# Patient Record
Sex: MALE | Race: BLACK OR AFRICAN AMERICAN | NOT HISPANIC OR LATINO | Employment: OTHER | URBAN - METROPOLITAN AREA
[De-identification: names, ages, dates, MRNs, and addresses within clinical notes are randomized per-mention and may not be internally consistent; named-entity substitution may affect disease eponyms.]

---

## 2017-05-16 ENCOUNTER — OFFICE VISIT (OUTPATIENT)
Dept: UROLOGY | Facility: CLINIC | Age: 82
End: 2017-05-16
Payer: MEDICARE

## 2017-05-16 VITALS
SYSTOLIC BLOOD PRESSURE: 138 MMHG | HEIGHT: 70 IN | WEIGHT: 247.38 LBS | BODY MASS INDEX: 35.42 KG/M2 | DIASTOLIC BLOOD PRESSURE: 78 MMHG | HEART RATE: 60 BPM

## 2017-05-16 DIAGNOSIS — N40.1 BPH WITH OBSTRUCTION/LOWER URINARY TRACT SYMPTOMS: Primary | ICD-10-CM

## 2017-05-16 DIAGNOSIS — N13.8 BPH WITH OBSTRUCTION/LOWER URINARY TRACT SYMPTOMS: Primary | ICD-10-CM

## 2017-05-16 DIAGNOSIS — R33.9 INCOMPLETE EMPTYING OF BLADDER: ICD-10-CM

## 2017-05-16 DIAGNOSIS — R35.1 NOCTURIA MORE THAN TWICE PER NIGHT: ICD-10-CM

## 2017-05-16 DIAGNOSIS — N52.9 ERECTILE DYSFUNCTION, UNSPECIFIED ERECTILE DYSFUNCTION TYPE: ICD-10-CM

## 2017-05-16 PROCEDURE — 99214 OFFICE O/P EST MOD 30 MIN: CPT | Mod: S$GLB,,, | Performed by: UROLOGY

## 2017-05-16 PROCEDURE — 1159F MED LIST DOCD IN RCRD: CPT | Mod: S$GLB,,, | Performed by: UROLOGY

## 2017-05-16 PROCEDURE — 99999 PR PBB SHADOW E&M-EST. PATIENT-LVL III: CPT | Mod: PBBFAC,,, | Performed by: UROLOGY

## 2017-05-16 PROCEDURE — 1160F RVW MEDS BY RX/DR IN RCRD: CPT | Mod: S$GLB,,, | Performed by: UROLOGY

## 2017-05-16 PROCEDURE — 1126F AMNT PAIN NOTED NONE PRSNT: CPT | Mod: S$GLB,,, | Performed by: UROLOGY

## 2017-05-16 RX ORDER — TADALAFIL 20 MG/1
20 TABLET ORAL DAILY PRN
Qty: 10 TABLET | Refills: 11 | Status: SHIPPED | OUTPATIENT
Start: 2017-05-16 | End: 2017-05-26

## 2017-05-16 NOTE — PROGRESS NOTES
Subjective:       Patient ID: Ridge Reynolds is a 82 y.o. male who was last seen in this office Visit date not found    Chief Complaint:   Chief Complaint   Patient presents with    Benign Prostatic Hypertrophy     6 month f/u     Erectile Dysfunction     Benign Prostatic Hyperplasia  He patient reports nocturia three times a night. He denies straining, urgency and weak stream. The patient states symptoms are of mild severity. Onset of symptoms was several years ago and was gradual in onset.  He has no personal history and no family history of prostate cancer. He reports a history of no complicating symptoms. He denies flank pain, gross hematuria, kidney stones and recurrent UTI.  He is currently taking Flomax and Proscar.    Erectile Dysfunction  Patient complains of erectile dysfunction. Onset of dysfunction was several years ago and was gradual in onset.  Patient states the nature of difficulty is both attaining and maintaining erection. Full erections occur never. Partial erections occur never. Libido is not affected. Risk factors for ED include cardiovascular disease. Patient denies history of pelvic radiation. Previous treatment of ED includes Cialis and Trimix.  He did not like Trimix due to penile bleeding.    ACTIVE MEDICAL ISSUES:  Patient Active Problem List   Diagnosis    BPH with obstruction/lower urinary tract symptoms    ED (erectile dysfunction)       ALLERGIES AND MEDICATIONS: updated and reviewed.  Review of patient's allergies indicates:  No Known Allergies  Current Outpatient Prescriptions   Medication Sig    amlodipine-valsartan (EXFORGE)  mg per tablet Take 1 tablet by mouth once daily.    BYSTOLIC 10 mg Tab Take 1 tablet by mouth once daily.    cloNIDine (CATAPRES) 0.2 MG tablet Take 1 tablet by mouth once daily.    finasteride (PROSCAR) 5 mg tablet Take 1 tablet (5 mg total) by mouth once daily.    furosemide (LASIX) 20 MG tablet Take 20 mg by mouth once daily.    tamsulosin  "(FLOMAX) 0.4 mg Cp24 Take 1 capsule (0.4 mg total) by mouth once daily.    tadalafil (CIALIS) 20 MG Tab Take 1 tablet (20 mg total) by mouth daily as needed.     No current facility-administered medications for this visit.        Review of Systems    Objective:      Vitals:    05/16/17 0916   BP: 138/78   Pulse: 60   Weight: 112.2 kg (247 lb 5.7 oz)   Height: 5' 10" (1.778 m)     Physical Exam   Nursing note and vitals reviewed.  Constitutional: He is oriented to person, place, and time. He appears well-developed and well-nourished.   HENT:   Head: Normocephalic.   Eyes: Conjunctivae are normal.   Neck: Normal range of motion. Neck supple. No tracheal deviation present. No thyromegaly present.   Cardiovascular: Normal rate and normal heart sounds.    Pulmonary/Chest: Effort normal and breath sounds normal. No respiratory distress. He has no wheezes.   Abdominal: Soft. Bowel sounds are normal. There is no hepatosplenomegaly. There is no tenderness. There is no rebound and no CVA tenderness. No hernia.   Musculoskeletal: Normal range of motion. He exhibits no edema or tenderness.   Lymphadenopathy:     He has no cervical adenopathy.   Neurological: He is alert and oriented to person, place, and time.   Skin: Skin is warm and dry. No rash noted. No erythema.     Psychiatric: He has a normal mood and affect. His behavior is normal. Judgment and thought content normal.       Urine dipstick shows negative for all components.  Micro exam: negative for WBC's or RBC's.    Assessment:       1. BPH with obstruction/lower urinary tract symptoms    2. Erectile dysfunction, unspecified erectile dysfunction type    3. Nocturia more than twice per night    4. Incomplete emptying of bladder          Plan:       1. BPH with obstruction/lower urinary tract symptoms  Stay on Flomax and Proscar    2. Erectile dysfunction, unspecified erectile dysfunction type    - tadalafil (CIALIS) 20 MG Tab; Take 1 tablet (20 mg total) by mouth daily " as needed.  Dispense: 10 tablet; Refill: 11    3. Nocturia more than twice per night  Take Lasix earlier in the day    4. Incomplete emptying of bladder  stable            Return in about 1 year (around 5/16/2018) for Follow up.

## 2017-05-16 NOTE — MR AVS SNAPSHOT
[facility]  [address] [facility] [address]  [place] [address]  Phone: [phone]                  Ridge Reynolds   2017 9:15 AM   Office Visit    Description:  Male : 1935   Provider:  CHRISTA Sahu MD   Department:  [facility]           Reason for Visit     Benign Prostatic Hypertrophy     Erectile Dysfunction           Diagnoses this Visit        Comments    BPH with obstruction/lower urinary tract symptoms    -  Primary     Erectile dysfunction, unspecified erectile dysfunction type         Nocturia more than twice per night         Incomplete emptying of bladder                To Do List           Goals (5 Years of Data)     None      Follow-Up and Disposition     Return in about 1 year (around 2018) for Follow up.       These Medications        Disp Refills Start End    tadalafil (CIALIS) 20 MG Tab 10 tablet 11 2017    Take 1 tablet (20 mg total) by mouth daily as needed. - Oral    Pharmacy: [place] [address] [address] Ph #: [phone]         [facility]     [facility] Nurse Care Line -  Assistance  Unless otherwise directed by your provider, please contact [facility], our nurse care line that is available for  assistance.     Registered nurses in the [facility] provide: appointment scheduling, clinical advisement, health education, and other advisory services.  Call: [phone] (toll free)               Medications           Message regarding Medications     Verify the changes and/or additions to your medication regime listed below are the same as discussed with your clinician today.  If any of these changes or additions are incorrect, please notify your healthcare provider.        START taking these NEW medications        Refills    tadalafil (CIALIS) 20 MG Tab 11    Sig: Take 1 tablet (20 mg total) by mouth daily as needed.    Class: Normal    Route: Oral      STOP taking  "these medications     vardenafil (LEVITRA) 20 MG tablet Take 1 tablet (20 mg total) by mouth daily as needed for Erectile Dysfunction.    sildenafil (VIAGRA) 100 MG tablet Take 1 tablet (100 mg total) by mouth daily as needed for Erectile Dysfunction.    syringe, disposable, 1 mL Syrg Use as directed    pep injection Inject 0.5 ml as directed     For compounding pharmacy use:   Add PAPAVERINE 30 mcg  Add PHENTOLAMINE 10 mg  Add ALPROSTADIL 100 mcg    needle, disp, 21 G 21 x 1 " Ndle Use as directed           Verify that the below list of medications is an accurate representation of the medications you are currently taking.  If none reported, the list may be blank. If incorrect, please contact your healthcare provider. Carry this list with you in case of emergency.           Current Medications     amlodipine-valsartan (EXFORGE)  mg per tablet Take 1 tablet by mouth once daily.    BYSTOLIC 10 mg Tab Take 1 tablet by mouth once daily.    cloNIDine (CATAPRES) 0.2 MG tablet Take 1 tablet by mouth once daily.    finasteride (PROSCAR) 5 mg tablet Take 1 tablet (5 mg total) by mouth once daily.    furosemide (LASIX) 20 MG tablet Take 20 mg by mouth once daily.    tamsulosin (FLOMAX) 0.4 mg Cp24 Take 1 capsule (0.4 mg total) by mouth once daily.    tadalafil (CIALIS) 20 MG Tab Take 1 tablet (20 mg total) by mouth daily as needed.           Clinical Reference Information           Your Vitals Were     BP Pulse Height Weight BMI    138/78 60 5' 10" (1.778 m) 112.2 kg (247 lb 5.7 oz) 35.49 kg/m2      Blood Pressure          Most Recent Value    BP  138/78      Allergies as of 5/16/2017     No Known Allergies      Immunizations Administered on Date of Encounter - 5/16/2017     None      [email or web address] Sign-Up     Activating your [facility] account is as easy as 1-2-3!     1) Visit [email or web address], select Sign Up Now, enter this activation code and your date of birth, then select Next.  [email or web address]  Expires: 6/30/2017  9:26 " AM      2) Create a username and password to use when you visit [facility] in the future and select a security question in case you lose your password and select Next.    3) Enter your e-mail address and click Sign Up!    Additional Information  If you have questions, please e-mail [email or web address] or call [phone] to talk to our [email or web address] staff. Remember, [facility] is NOT to be used for urgent needs. For medical emergencies, dial 911.         Language Assistance Services     ATTENTION: Language assistance services are available, free of charge. Please call [phone].      ATENCIÓN: Si habla joleen, tiene a chavez disposición servicios gratuitos de asistencia lingüística. Llame al [phone].     CHÚ Ý: N?u b?n nói Ti?ng Vi?t, có các d?ch v? h? tr? ngôn ng? mi?n phí dành cho b?n. G?i s? [phone].         [facility] complies with applicable Federal civil rights laws and does not discriminate on the basis of race, color, national origin, age, disability, or sex.

## 2020-03-04 ENCOUNTER — TELEPHONE (OUTPATIENT)
Dept: UROLOGY | Facility: CLINIC | Age: 85
End: 2020-03-04

## 2020-03-04 NOTE — TELEPHONE ENCOUNTER
Spoke with pt, informed that he haven't been seen since 2017 and need to schedule an appt with Dr. Sahu or [facility]. Pt scheduled with [facility] because appt was sooner. -Torie